# Patient Record
Sex: FEMALE | Race: WHITE | NOT HISPANIC OR LATINO | Employment: OTHER | URBAN - METROPOLITAN AREA
[De-identification: names, ages, dates, MRNs, and addresses within clinical notes are randomized per-mention and may not be internally consistent; named-entity substitution may affect disease eponyms.]

---

## 2017-01-28 ENCOUNTER — GENERIC CONVERSION - ENCOUNTER (OUTPATIENT)
Dept: OTHER | Facility: OTHER | Age: 52
End: 2017-01-28

## 2017-09-14 ENCOUNTER — GENERIC CONVERSION - ENCOUNTER (OUTPATIENT)
Dept: OTHER | Facility: OTHER | Age: 52
End: 2017-09-14

## 2018-01-15 NOTE — MISCELLANEOUS
Internal Medicine Message  GI Reminder Recall [facility]:   Date: 01/28/2017   Dear Ananda Laureano:     Review of our records shows you are due for the following: Colonoscopy  Please call the following office to schedule your appointment:   [address] [facility], [address], [address] [place] [address], [address] [place] [address]  [phone]  We look forward to hearing from you!      Sincerely,     Dr Niki Mueller MD      Signatures   Electronically signed by : Citlaly Latham, ; Jan 28 2017  9:20AM EST                       (Author)

## 2018-01-22 VITALS
WEIGHT: 162 LBS | SYSTOLIC BLOOD PRESSURE: 124 MMHG | DIASTOLIC BLOOD PRESSURE: 82 MMHG | HEIGHT: 66 IN | BODY MASS INDEX: 26.03 KG/M2

## 2020-02-24 ENCOUNTER — HOSPITAL ENCOUNTER (EMERGENCY)
Facility: HOSPITAL | Age: 55
Discharge: HOME | End: 2020-02-24
Attending: EMERGENCY MEDICINE | Admitting: EMERGENCY MEDICINE
Payer: OTHER GOVERNMENT

## 2020-02-24 VITALS
HEIGHT: 67 IN | BODY MASS INDEX: 25.64 KG/M2 | WEIGHT: 163.36 LBS | OXYGEN SATURATION: 100 % | SYSTOLIC BLOOD PRESSURE: 120 MMHG | HEART RATE: 66 BPM | RESPIRATION RATE: 18 BRPM | DIASTOLIC BLOOD PRESSURE: 65 MMHG | TEMPERATURE: 98.1 F

## 2020-02-24 DIAGNOSIS — T78.40XA ALLERGIC REACTION, INITIAL ENCOUNTER: Primary | ICD-10-CM

## 2020-02-24 PROCEDURE — 96374 THER/PROPH/DIAG INJ IV PUSH: CPT

## 2020-02-24 PROCEDURE — 99284 EMERGENCY DEPT VISIT MOD MDM: CPT

## 2020-02-24 PROCEDURE — 96375 TX/PRO/DX INJ NEW DRUG ADDON: CPT

## 2020-02-24 PROCEDURE — 99284 EMERGENCY DEPT VISIT MOD MDM: CPT | Performed by: EMERGENCY MEDICINE

## 2020-02-24 RX ORDER — DIPHENHYDRAMINE HYDROCHLORIDE 50 MG/ML
25 INJECTION INTRAMUSCULAR; INTRAVENOUS ONCE
Status: COMPLETED | OUTPATIENT
Start: 2020-02-24 | End: 2020-02-24

## 2020-02-24 RX ORDER — EPINEPHRINE 1 MG/ML
1 INJECTION, SOLUTION, CONCENTRATE INTRAVENOUS ONCE
Status: COMPLETED | OUTPATIENT
Start: 2020-02-24 | End: 2020-02-24

## 2020-02-24 RX ORDER — DIPHENHYDRAMINE HCL 25 MG
25 TABLET ORAL EVERY 6 HOURS PRN
Qty: 30 TABLET | Refills: 0 | Status: SHIPPED | OUTPATIENT
Start: 2020-02-24

## 2020-02-24 RX ORDER — EPINEPHRINE 0.3 MG/.3ML
0.3 INJECTION SUBCUTANEOUS ONCE
Qty: 0.6 ML | Refills: 0 | Status: SHIPPED | OUTPATIENT
Start: 2020-02-24 | End: 2020-02-24

## 2020-02-24 RX ORDER — PREDNISONE 20 MG/1
40 TABLET ORAL DAILY
Qty: 8 TABLET | Refills: 0 | Status: SHIPPED | OUTPATIENT
Start: 2020-02-24 | End: 2020-02-28

## 2020-02-24 RX ADMIN — FAMOTIDINE 20 MG: 10 INJECTION, SOLUTION INTRAVENOUS at 18:23

## 2020-02-24 RX ADMIN — DIPHENHYDRAMINE HYDROCHLORIDE 25 MG: 50 INJECTION, SOLUTION INTRAMUSCULAR; INTRAVENOUS at 18:23

## 2020-02-24 NOTE — ED PROVIDER NOTES
History  Chief Complaint   Patient presents with    Allergic Reaction     Pt presented to EMS building with c/o itching, swelling throat - possible allergice reaction to clindamyacin  Received 75mg Benadryl and 0 3mg epi prehospital   Pt has no complaints at this time and is in no apparent distress (respiratory or otherwise)     HPI  [de-identified] year female presents allergic reaction  She states that she took a dose of clindamycin today that she was prescribed for sinus infection and then felt her lips tingling and her throat closing  She took 25 mg of Benadryl, called EMS who gave her 0 3 mg epinephrine and 50 mg of Benadryl in addition to steroid  She states that now she feels much better  She denies any wheezing, abdominal pain, chest pain, shortness of breath  She states that she took clindamycin in the past and had a similar reaction but thought it could be related to shrimp  None       No past medical history on file  No past surgical history on file  No family history on file  I have reviewed and agree with the history as documented  No existing history information found  No existing history information found  Social History     Tobacco Use    Smoking status: Not on file   Substance Use Topics    Alcohol use: Not on file    Drug use: Not on file       Review of Systems   Constitutional: Negative for chills and fever  HENT: Negative for dental problem and ear pain  +throat swelling and lip tingling   Eyes: Negative for pain and redness  Respiratory: Negative for cough and shortness of breath  Cardiovascular: Negative for chest pain and palpitations  Gastrointestinal: Negative for abdominal pain and nausea  Endocrine: Negative for polydipsia and polyphagia  Genitourinary: Negative for dysuria and frequency  Musculoskeletal: Negative for arthralgias and joint swelling  Skin: Negative for color change and rash  Neurological: Negative for dizziness and headaches  Psychiatric/Behavioral: Negative for behavioral problems and confusion  All other systems reviewed and are negative  Physical Exam  Physical Exam   Constitutional: She is oriented to person, place, and time  She appears well-developed and well-nourished  No distress  HENT:   Head: Atraumatic  Right Ear: External ear normal    Left Ear: External ear normal    Nose: Nose normal    Eyes: Pupils are equal, round, and reactive to light  Conjunctivae and EOM are normal    Neck: Normal range of motion  Neck supple  No JVD present  Cardiovascular: Normal rate, regular rhythm and normal heart sounds  No murmur heard  Pulmonary/Chest: Effort normal and breath sounds normal  No respiratory distress  She has no wheezes  Abdominal: Soft  Bowel sounds are normal  She exhibits no distension  There is no tenderness  Musculoskeletal: Normal range of motion  She exhibits no edema  Neurological: She is alert and oriented to person, place, and time  No cranial nerve deficit  Skin: Skin is warm and dry  Capillary refill takes less than 2 seconds  She is not diaphoretic  Psychiatric: She has a normal mood and affect  Her behavior is normal    Nursing note and vitals reviewed        Vital Signs  ED Triage Vitals [02/24/20 1551]   Temperature Pulse Respirations Blood Pressure SpO2   97 8 °F (36 6 °C) 90 18 137/71 99 %      Temp Source Heart Rate Source Patient Position - Orthostatic VS BP Location FiO2 (%)   Oral Monitor Lying Right arm --      Pain Score       5           Vitals:    02/24/20 1551 02/24/20 1659 02/24/20 1808   BP: 137/71 109/55 120/65   Pulse: 90 72 66   Patient Position - Orthostatic VS: Lying Lying Sitting         Visual Acuity      ED Medications  Medications   EPINEPHrine PF (FOR EMS ONLY) (ADRENALIN) 1 mg/mL injection 1 mg (0 mg Does not apply Given to EMS 2/24/20 1551)   diphenhydrAMINE (FOR EMS ONLY) (BENADRYL) injection 50 mg (0 mg Does not apply Given to EMS 2/24/20 1551) diphenhydrAMINE (BENADRYL) injection 25 mg (25 mg Intravenous Given 2/24/20 1823)   famotidine (PEPCID) injection 20 mg (20 mg Intravenous Given 2/24/20 1823)       Diagnostic Studies  Results Reviewed     None                 No orders to display              Procedures  Procedures         ED Course                               MDM  Patient presents with allergic reaction, she received epinephrine with EMS, she was observed in the ED for 4 hours since he received the epinephrine, gave Benadryl and Pepcid, no recurrence of symptoms, will DC with prednisone, Benadryl and epinephrine if she has any throat swelling in the future  Disposition  Final diagnoses: Allergic reaction, initial encounter     Time reflects when diagnosis was documented in both MDM as applicable and the Disposition within this note     Time User Action Codes Description Comment    2/24/2020  7:25 PM Alyssatheresa Lexy Cheng [[id number]] Allergic reaction, initial encounter       ED Disposition     ED Disposition Condition Date/Time Comment    Discharge Stable Mon Feb 24, 2020  7:24 PM Carol Cortez discharge to home/self care  Follow-up Information     Follow up With Specialties Details Why Sima Blanco MD Internal Medicine  As needed [phone]  [address] [facility] Emergency Department Emergency Medicine  As needed [address] [facility] [address]  [phone] [place] ED, [address] [place] [address], [facility]          Discharge Medication List as of 2/24/2020  7:27 PM      START taking these medications    Details   diphenhydrAMINE (BENADRYL) 25 mg tablet Take 1 tablet (25 mg total) by mouth every 6 (six) hours as needed for itching, Starting Mon 2/24/2020, Print      EPINEPHrine (EPIPEN) 0 3 mg/0 3 mL SOAJ Inject 0 3 mL (0 3 mg total) into a muscle once for 1 dose, Starting Mon 2/24/2020, Print predniSONE 20 mg tablet Take 2 tablets (40 mg total) by mouth daily for 4 days, Starting Mon 2/24/2020, Until Fri 2/28/2020, Print           No discharge procedures on file      PDMP Review     None          ED Provider  Electronically Signed by           Dalton Douglas MD  02/25/20 4145

## 2020-02-25 NOTE — DISCHARGE INSTRUCTIONS
Take steroid and benadryl as prescribed, use epi pen if your throat is closing up again and call 911

## 2020-08-17 ENCOUNTER — OFFICE VISIT (OUTPATIENT)
Dept: FAMILY MEDICINE CLINIC | Facility: CLINIC | Age: 55
End: 2020-08-17

## 2020-08-17 VITALS
HEART RATE: 76 BPM | BODY MASS INDEX: 22.87 KG/M2 | WEIGHT: 146 LBS | OXYGEN SATURATION: 99 % | DIASTOLIC BLOOD PRESSURE: 82 MMHG | SYSTOLIC BLOOD PRESSURE: 120 MMHG

## 2020-08-17 DIAGNOSIS — Z02.89 ENCOUNTER FOR FEDERAL AVIATION ADMINISTRATION (FAA) EXAMINATION: Primary | ICD-10-CM

## 2020-08-17 PROCEDURE — 99499 UNLISTED E&M SERVICE: CPT | Performed by: FAMILY MEDICINE

## 2020-08-17 NOTE — PROGRESS NOTES
Chief Complaint   Patient presents with    Physical Exam     FAA-2nd-No EKG-No Correctives-CONF#[id number]

## 2021-10-08 ENCOUNTER — APPOINTMENT (OUTPATIENT)
Dept: LAB | Facility: CLINIC | Age: 56
End: 2021-10-08
Payer: OTHER GOVERNMENT

## 2021-10-08 DIAGNOSIS — I51.9 MYXEDEMA HEART DISEASE: ICD-10-CM

## 2021-10-08 DIAGNOSIS — E03.9 MYXEDEMA HEART DISEASE: ICD-10-CM

## 2021-10-08 LAB — TSH SERPL DL<=0.05 MIU/L-ACNC: 3.1 UIU/ML (ref 0.36–3.74)

## 2021-10-08 PROCEDURE — 36415 COLL VENOUS BLD VENIPUNCTURE: CPT

## 2021-10-08 PROCEDURE — 84443 ASSAY THYROID STIM HORMONE: CPT

## 2022-08-09 ENCOUNTER — OFFICE VISIT (OUTPATIENT)
Dept: FAMILY MEDICINE CLINIC | Facility: CLINIC | Age: 57
End: 2022-08-09

## 2022-08-09 VITALS
WEIGHT: 153 LBS | SYSTOLIC BLOOD PRESSURE: 102 MMHG | BODY MASS INDEX: 24.01 KG/M2 | DIASTOLIC BLOOD PRESSURE: 60 MMHG | HEART RATE: 86 BPM | HEIGHT: 67 IN

## 2022-08-09 DIAGNOSIS — Z02.89 ENCOUNTER FOR FEDERAL AVIATION ADMINISTRATION (FAA) EXAMINATION: Primary | ICD-10-CM

## 2022-08-09 PROCEDURE — 99499 UNLISTED E&M SERVICE: CPT | Performed by: FAMILY MEDICINE

## 2022-08-09 NOTE — PROGRESS NOTES
Chief Complaint   Patient presents with    Physical Exam     FAA 2nd class, no meds, no EKG, no letter, no correctives

## 2024-07-22 PROBLEM — N84.0 POLYP OF CORPUS UTERI: Status: ACTIVE | Noted: 2024-07-22

## 2024-07-22 PROBLEM — L71.9 ROSACEA, UNSPECIFIED: Status: ACTIVE | Noted: 2024-07-22

## 2024-07-22 PROBLEM — H52.4 PRESBYOPIA: Status: ACTIVE | Noted: 2024-07-22

## 2024-07-22 PROBLEM — R10.2 PAIN IN PELVIS: Status: ACTIVE | Noted: 2024-07-22

## 2024-07-22 PROBLEM — J30.1 ALLERGIC RHINITIS DUE TO POLLEN: Status: ACTIVE | Noted: 2017-08-13

## 2024-07-22 PROBLEM — H04.129 DRY EYE SYNDROME: Status: ACTIVE | Noted: 2024-07-22

## 2024-07-22 PROBLEM — N93.9 ABNORMAL UTERINE BLEEDING: Status: ACTIVE | Noted: 2024-07-22

## 2024-07-22 PROBLEM — H52.209 ASTIGMATISM: Status: ACTIVE | Noted: 2024-07-22

## 2024-07-22 PROBLEM — K04.7 PERIAPICAL ABSCESS WITHOUT SINUS: Status: ACTIVE | Noted: 2024-07-22

## 2024-07-22 PROBLEM — K02.63 DENTAL CARIES ON SMOOTH SURFACE PENETRATING INTO PULP: Status: ACTIVE | Noted: 2024-07-22

## 2024-07-22 PROBLEM — Z87.39 H/O: OSTEOARTHRITIS: Status: ACTIVE | Noted: 2024-07-22

## 2024-07-22 PROBLEM — R22.0 SWELLING OF SUBMANDIBULAR REGION: Status: ACTIVE | Noted: 2018-02-26

## 2024-07-22 PROBLEM — R22.1 SWELLING OF SUBMANDIBULAR REGION: Status: ACTIVE | Noted: 2018-02-26

## 2024-07-22 PROBLEM — H10.10 ALLERGIC CONJUNCTIVITIS: Status: ACTIVE | Noted: 2024-07-22

## 2024-07-22 PROBLEM — H52.00 HYPEROPIA: Status: ACTIVE | Noted: 2024-07-22

## 2024-07-22 PROBLEM — K03.6 DEPOSITS (ACCRETIONS) ON TEETH: Status: ACTIVE | Noted: 2024-07-22

## 2024-07-22 PROBLEM — A69.20 LYME DISEASE, UNSPECIFIED: Status: ACTIVE | Noted: 2024-07-22

## 2024-07-22 PROBLEM — E55.9 VITAMIN D2 DEFICIENCY: Status: ACTIVE | Noted: 2024-07-22

## 2024-07-22 PROBLEM — Z71.89 OTHER SPECIFIED COUNSELING: Status: ACTIVE | Noted: 2024-07-22

## 2024-07-22 PROBLEM — K08.433: Status: ACTIVE | Noted: 2024-07-22

## 2024-07-22 PROBLEM — E03.9 HYPOTHYROIDISM, UNSPECIFIED: Status: ACTIVE | Noted: 2024-07-22

## 2024-07-22 PROBLEM — H04.123 DRY EYE SYNDROME OF BILATERAL LACRIMAL GLANDS: Status: ACTIVE | Noted: 2024-07-22

## 2024-07-22 PROBLEM — R59.0 LOCALIZED ENLARGED LYMPH NODES: Status: ACTIVE | Noted: 2024-07-22

## 2024-07-22 PROBLEM — H02.889 MEIBOMIAN GLAND DYSFUNCTION (MGD): Status: ACTIVE | Noted: 2024-07-22

## 2024-07-22 PROBLEM — J30.81 ALLERGIC RHINITIS DUE TO ANIMAL HAIR AND DANDER: Status: ACTIVE | Noted: 2017-08-13

## 2024-07-22 PROBLEM — K02.9 DENTAL CARIES, UNSPECIFIED: Status: ACTIVE | Noted: 2024-07-22

## 2024-07-22 PROBLEM — T78.1XXA ALLERGIC REACTION TO FOOD: Status: ACTIVE | Noted: 2024-07-22

## 2024-07-22 PROBLEM — M25.559 HIP PAIN: Status: ACTIVE | Noted: 2024-07-22

## 2024-08-21 PROBLEM — H10.10 ALLERGIC CONJUNCTIVITIS: Status: RESOLVED | Noted: 2024-07-22 | Resolved: 2024-08-21

## 2025-01-15 ENCOUNTER — TELEPHONE (OUTPATIENT)
Age: 60
End: 2025-01-15

## 2025-01-15 NOTE — TELEPHONE ENCOUNTER
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 Thanks

## 2025-01-27 ENCOUNTER — OFFICE VISIT (OUTPATIENT)
Age: 60
End: 2025-01-27

## 2025-01-27 VITALS
DIASTOLIC BLOOD PRESSURE: 72 MMHG | HEART RATE: 66 BPM | BODY MASS INDEX: 24.17 KG/M2 | WEIGHT: 154 LBS | HEIGHT: 67 IN | SYSTOLIC BLOOD PRESSURE: 110 MMHG

## 2025-01-27 DIAGNOSIS — Z02.89 ENCOUNTER FOR FEDERAL AVIATION ADMINISTRATION (FAA) EXAMINATION: Primary | ICD-10-CM

## 2025-01-27 PROCEDURE — 99499 UNLISTED E&M SERVICE: CPT | Performed by: FAMILY MEDICINE

## 2025-01-27 NOTE — PROGRESS NOTES
Chief Complaint   Patient presents with   • Physical Exam     FAA 2nd class, no correctives, meds